# Patient Record
Sex: MALE | Race: WHITE | NOT HISPANIC OR LATINO | Employment: OTHER | URBAN - METROPOLITAN AREA
[De-identification: names, ages, dates, MRNs, and addresses within clinical notes are randomized per-mention and may not be internally consistent; named-entity substitution may affect disease eponyms.]

---

## 2017-11-29 RX ORDER — NAPROXEN 500 MG/1
500 TABLET ORAL
COMMUNITY
Start: 2014-03-11

## 2017-11-29 RX ORDER — LISINOPRIL 10 MG/1
10 TABLET ORAL
COMMUNITY
Start: 2016-06-14

## 2017-11-29 RX ORDER — IBUPROFEN 800 MG/1
800 TABLET, FILM COATED ORAL
COMMUNITY
Start: 2016-06-14 | End: 2020-09-20

## 2017-11-29 RX ORDER — NITROGLYCERIN 0.4 MG/1
0.4 TABLET SUBLINGUAL
COMMUNITY
Start: 2016-06-14

## 2017-11-29 RX ORDER — ATORVASTATIN CALCIUM 40 MG/1
40 TABLET, FILM COATED ORAL
COMMUNITY
Start: 2016-06-14

## 2017-11-29 RX ORDER — ASPIRIN 81 MG/1
81 TABLET ORAL
COMMUNITY
Start: 2012-07-17

## 2017-12-01 ENCOUNTER — ANESTHESIA EVENT (OUTPATIENT)
Dept: GASTROENTEROLOGY | Facility: CLINIC | Age: 67
End: 2017-12-01
Payer: MEDICARE

## 2017-12-01 NOTE — ANESTHESIA PREPROCEDURE EVALUATION
Anesthesia Evaluation     .             ROS/MED HX    ENT/Pulmonary:       Neurologic: Comment: [place] Palsy      Cardiovascular:     (+) Dyslipidemia, hypertension-Peripheral Vascular Disease-CAD, --. : . . . :. .       METS/Exercise Tolerance:  >4 METS   Hematologic:  - neg hematologic  ROS       Musculoskeletal:  - neg musculoskeletal ROS       GI/Hepatic:  - neg GI/hepatic ROS       Renal/Genitourinary:  - ROS Renal section negative       Endo:     (+) Obesity, .      Psychiatric:         Infectious Disease:  - neg infectious disease ROS       Malignancy:      - no malignancy   Other:    - neg other ROS                 Physical Exam  Normal systems: cardiovascular, pulmonary and dental    Airway   Mallampati: II  TM distance: >3 FB  Neck ROM: full    Dental     Cardiovascular       Pulmonary                     Anesthesia Plan      History & Physical Review  History and physical reviewed and following examination; no interval change.    ASA Status:  3 .    NPO Status:  > 8 hours    Plan for General and MAC with Propofol and Intravenous induction. Reason for MAC:  Deep or markedly invasive procedure (G8)  PONV prophylaxis:  Ondansetron (or other 5HT-3) and Dexamethasone or Solumedrol       Postoperative Care  Postoperative pain management:  IV analgesics and Oral pain medications.      Consents  Anesthetic plan, risks, benefits and alternatives discussed with:  Patient..                          .

## 2017-12-04 ENCOUNTER — ANESTHESIA (OUTPATIENT)
Dept: GASTROENTEROLOGY | Facility: CLINIC | Age: 67
End: 2017-12-04
Payer: MEDICARE

## 2017-12-04 ENCOUNTER — HOSPITAL ENCOUNTER (OUTPATIENT)
Facility: CLINIC | Age: 67
Discharge: HOME | End: 2017-12-04
Attending: SURGERY | Admitting: SURGERY
Payer: MEDICARE

## 2017-12-04 ENCOUNTER — SURGERY (OUTPATIENT)
Age: 67
End: 2017-12-04

## 2017-12-04 VITALS
OXYGEN SATURATION: 97 % | WEIGHT: 285 LBS | SYSTOLIC BLOOD PRESSURE: 106 MMHG | HEART RATE: 64 BPM | HEIGHT: 68 IN | DIASTOLIC BLOOD PRESSURE: 66 MMHG | RESPIRATION RATE: 16 BRPM | TEMPERATURE: 96.8 F | BODY MASS INDEX: 43.19 KG/M2

## 2017-12-04 LAB — COLONOSCOPY: NORMAL

## 2017-12-04 PROCEDURE — 25000128 H RX IP 250 OP 636: Performed by: NURSE ANESTHETIST, CERTIFIED REGISTERED

## 2017-12-04 PROCEDURE — 25000125 ZZHC RX 250: Performed by: NURSE ANESTHETIST, CERTIFIED REGISTERED

## 2017-12-04 PROCEDURE — 25000128 H RX IP 250 OP 636: Performed by: SURGERY

## 2017-12-04 PROCEDURE — G0121 COLON CA SCRN NOT HI RSK IND: HCPCS | Performed by: SURGERY

## 2017-12-04 PROCEDURE — 37000008 ZZH ANESTHESIA TECHNICAL FEE, 1ST 30 MIN: Performed by: SURGERY

## 2017-12-04 PROCEDURE — 45378 DIAGNOSTIC COLONOSCOPY: CPT | Performed by: SURGERY

## 2017-12-04 PROCEDURE — 25000125 ZZHC RX 250: Performed by: SURGERY

## 2017-12-04 RX ORDER — IBUPROFEN 800 MG/1
TABLET, FILM COATED ORAL
COMMUNITY
Start: 2017-10-02 | End: 2020-09-20

## 2017-12-04 RX ORDER — NITROGLYCERIN 0.4 MG/1
0.4 TABLET SUBLINGUAL
COMMUNITY
Start: 2016-06-14

## 2017-12-04 RX ORDER — FLUCONAZOLE 200 MG/1
200 TABLET ORAL
COMMUNITY
Start: 2017-08-28 | End: 2018-02-06

## 2017-12-04 RX ORDER — ATORVASTATIN CALCIUM 40 MG/1
40 TABLET, FILM COATED ORAL
COMMUNITY
Start: 2017-09-26

## 2017-12-04 RX ORDER — PROPOFOL 10 MG/ML
INJECTION, EMULSION INTRAVENOUS CONTINUOUS PRN
Status: DISCONTINUED | OUTPATIENT
Start: 2017-12-04 | End: 2017-12-04

## 2017-12-04 RX ORDER — SODIUM CHLORIDE, SODIUM LACTATE, POTASSIUM CHLORIDE, CALCIUM CHLORIDE 600; 310; 30; 20 MG/100ML; MG/100ML; MG/100ML; MG/100ML
INJECTION, SOLUTION INTRAVENOUS CONTINUOUS
Status: DISCONTINUED | OUTPATIENT
Start: 2017-12-04 | End: 2017-12-04 | Stop reason: HOSPADM

## 2017-12-04 RX ORDER — KETOCONAZOLE 20 MG/G
CREAM TOPICAL
COMMUNITY
Start: 2017-08-28

## 2017-12-04 RX ORDER — LIDOCAINE 40 MG/G
CREAM TOPICAL
Status: DISCONTINUED | OUTPATIENT
Start: 2017-12-04 | End: 2017-12-04 | Stop reason: HOSPADM

## 2017-12-04 RX ORDER — LIDOCAINE HYDROCHLORIDE 10 MG/ML
INJECTION, SOLUTION INFILTRATION; PERINEURAL PRN
Status: DISCONTINUED | OUTPATIENT
Start: 2017-12-04 | End: 2017-12-04

## 2017-12-04 RX ORDER — LISINOPRIL 10 MG/1
10 TABLET ORAL
COMMUNITY
Start: 2017-10-17

## 2017-12-04 RX ORDER — PROPOFOL 10 MG/ML
INJECTION, EMULSION INTRAVENOUS PRN
Status: DISCONTINUED | OUTPATIENT
Start: 2017-12-04 | End: 2017-12-04

## 2017-12-04 RX ORDER — ONDANSETRON 2 MG/ML
4 INJECTION INTRAMUSCULAR; INTRAVENOUS
Status: DISCONTINUED | OUTPATIENT
Start: 2017-12-04 | End: 2017-12-04 | Stop reason: HOSPADM

## 2017-12-04 RX ADMIN — PROPOFOL 100 MG: 10 INJECTION, EMULSION INTRAVENOUS at 12:52

## 2017-12-04 RX ADMIN — SODIUM CHLORIDE, POTASSIUM CHLORIDE, SODIUM LACTATE AND CALCIUM CHLORIDE: 600; 310; 30; 20 INJECTION, SOLUTION INTRAVENOUS at 12:07

## 2017-12-04 RX ADMIN — PROPOFOL 200 MCG/KG/MIN: 10 INJECTION, EMULSION INTRAVENOUS at 12:56

## 2017-12-04 RX ADMIN — LIDOCAINE HYDROCHLORIDE 50 MG: 10 INJECTION, SOLUTION INFILTRATION; PERINEURAL at 12:52

## 2017-12-04 RX ADMIN — LIDOCAINE HYDROCHLORIDE 1 ML: 10 INJECTION, SOLUTION EPIDURAL; INFILTRATION; INTRACAUDAL; PERINEURAL at 12:07

## 2017-12-04 NOTE — H&P
"[facility]  GI Pre-Procedure History & Physical      Name: Vinnie Leavitt MRN#: [id number]   Age: 67 year old YOB: 1950     Date of Procedure: 12/4/2017    Primary care provider: Hoang Goodman      Reason for Procedure:   Screening (V76.51), Blood in stool (569.3)    Problem List:   See a copy of the patient s current problem list from [facility].  I have reviewed this document and have no additions or corrections.    Current Outpatient Medications:      No current outpatient prescriptions on file.        Allergies:      Allergies   Allergen Reactions     Penicillins Swelling and Hives     Throat       Tramadol Hives and Rash        History:   See a copy of the patient s current past history from [facility].  I have reviewed this document and have no additions or corrections.    Physical Exam:   Vital Signs:  /80  Pulse 64  Temp 98.5  F (36.9  C) (Oral)  Resp 18  Ht 1.727 m (5' 8\")  Wt 129.3 kg (285 lb)  SpO2 96%  BMI 43.33 kg/m2  Airway assessment:  Patient is able to open mouth wide  Patient is able to stick out tongue    ASA:  2  Mallampati Score: 2     Lungs:  No increased work of breathing, good air exchange, clear to auscultation bilaterally, no crackles or wheezing.   Cardiovascular:  Normal- regular rate and rhythm, normal S1 - S2, no S3 or S4, and no murmur noted.  Gastrointestinal:  Abdomen soft, non-tender.  BS normal. No masses, organomegaly.        Assessment:   Appropriately NPO.  No significant changes since H&P.    Plan:   Moderate (conscious) sedation     General and specific risks of the procedure of the procedure including pain, bleeding, and perforation were discussed. Possibility of missing lesions discussed. Prep and recovery were discussed. He asked appropriate questions and provided consent.      Patient's active problems diagnostically and therapeutically optimized for the planned procedure. Risks, benefits, alternatives to sedation and blood explained and " consent obtained.  Risks, benefits, alternatives to procedure explained and consent obtained.    I have reviewed the history and physical, lab finding(s), diagnostic data, medications, and the plan for sedation.  I have determined this patient to be an appropriate candidate for the planned sedation/procedure and have reassessed the patient immediately prior to sedation/procedure.    Amadou Diallo MD

## 2017-12-04 NOTE — ANESTHESIA CARE TRANSFER NOTE
Patient: Vinnie Leavitt    Procedure(s):  Colonoscopy - Wound Class: II-Clean Contaminated    Diagnosis: screening  Diagnosis Additional Information: No value filed.    Anesthesia Type:   No value filed.     Note:  Airway :Nasal Cannula  Patient transferred to:Phase II  Handoff Report: Identifed the Patient, Identified the Reponsible Provider, Reviewed the pertinent medical history, Discussed the surgical course, Reviewed Intra-OP anesthesia mangement and issues during anesthesia, Set expectations for post-procedure period and Allowed opportunity for questions and acknowledgement of understanding      Vitals: (Last set prior to Anesthesia Care Transfer)    CRNA VITALS  12/4/2017 1242 - 12/4/2017 1312      12/4/2017             Pulse: 61    SpO2: 97 %                Electronically Signed By: NATHANIEL Villafana CRNA  December 4, 2017  1:12 PM

## 2017-12-04 NOTE — OR NURSING
in to see pt and wife. Vss. Iv dc will eat and drink. Instructions given and no further questions.

## 2017-12-04 NOTE — ANESTHESIA POSTPROCEDURE EVALUATION
Patient: Vinnie Leavitt    Procedure(s):  Colonoscopy - Wound Class: II-Clean Contaminated    Diagnosis:screening  Diagnosis Additional Information: No value filed.    Anesthesia Type:  No value filed.    Note:  Anesthesia Post Evaluation    Patient location during evaluation: Bedside  Patient participation: Able to fully participate in evaluation  Level of consciousness: sleepy but conscious  Pain management: adequate  Airway patency: patent  Cardiovascular status: stable  Respiratory status: nasal cannula  Hydration status: stable  PONV: none     Anesthetic complications: None          Last vitals:  Vitals:    12/04/17 1136   BP: 120/80   Pulse: 64   Resp: 18   Temp: 36.9  C (98.5  F)   SpO2: 96%         Electronically Signed By: NATHANIEL Villafana CRNA  December 4, 2017  1:12 PM

## 2018-02-10 ENCOUNTER — HOSPITAL ENCOUNTER (EMERGENCY)
Facility: CLINIC | Age: 68
Discharge: HOME | End: 2018-02-10
Attending: NURSE PRACTITIONER | Admitting: NURSE PRACTITIONER
Payer: MEDICARE

## 2018-02-10 ENCOUNTER — APPOINTMENT (OUTPATIENT)
Dept: GENERAL RADIOLOGY | Facility: CLINIC | Age: 68
End: 2018-02-10
Attending: NURSE PRACTITIONER
Payer: MEDICARE

## 2018-02-10 VITALS
TEMPERATURE: 98.3 F | OXYGEN SATURATION: 98 % | RESPIRATION RATE: 16 BRPM | DIASTOLIC BLOOD PRESSURE: 93 MMHG | HEIGHT: 68 IN | SYSTOLIC BLOOD PRESSURE: 164 MMHG

## 2018-02-10 DIAGNOSIS — J11.1 INFLUENZA-LIKE ILLNESS: ICD-10-CM

## 2018-02-10 PROCEDURE — 99213 OFFICE O/P EST LOW 20 MIN: CPT | Performed by: NURSE PRACTITIONER

## 2018-02-10 PROCEDURE — 71046 X-RAY EXAM CHEST 2 VIEWS: CPT

## 2018-02-10 PROCEDURE — G0463 HOSPITAL OUTPT CLINIC VISIT: HCPCS | Mod: 25

## 2018-02-10 NOTE — ED AVS SNAPSHOT
[facility] Emergency Department    [address] [address]    Phone:  [phone]    Fax:  [phone]                                       Vinnie Leavitt   MRN: [id number]    Department:  [facility] Emergency Department   Date of Visit:  2/10/2018           After Visit Summary Signature Page     I have received my discharge instructions, and my questions have been answered. I have discussed any challenges I see with this plan with the nurse or doctor.    ..........................................................................................................................................  Patient/Patient Representative Signature      ..........................................................................................................................................  Patient Representative Print Name and Relationship to Patient    ..................................................               ................................................  Date                                            Time    ..........................................................................................................................................  Reviewed by Signature/Title    ...................................................              ..............................................  Date                                                            Time

## 2018-02-10 NOTE — ED PROVIDER NOTES
"  History     Chief Complaint   Patient presents with     Flu Symptoms     started tuesday     HPI  Vinnie Leavitt is a 68 year old male who presents to urgent care for nasal congestion, cough, and body aches. Symptoms started 4 days ago. Accompanied by chills. Denies chest pain or shortness of breath. Denies nausea or vomiting. Tolerating fluids. No known ill exposures.  Former smoker.     Problem List:    There are no active problems to display for this patient.       Past Medical History:    History reviewed. No pertinent past medical history.    Past Surgical History:    Past Surgical History:   Procedure Laterality Date     COLONOSCOPY N/A 12/4/2017    Procedure: COLONOSCOPY;  Colonoscopy;  Surgeon: Amadou Diallo MD;  Location: [facility]       Family History:    No family history on file.    Social History:  Marital Status:   [2]  Social History   Substance Use Topics     Smoking status: Former Smoker     Smokeless tobacco: Never Used     Alcohol use Yes      Comment: Occasional        Medications:      ketoconazole (NIZORAL) 2 % cream   atorvastatin (LIPITOR) 40 MG tablet   ibuprofen (ADVIL/MOTRIN) 800 MG tablet   lisinopril (PRINIVIL/ZESTRIL) 10 MG tablet   nitroGLYcerin (NITROSTAT) 0.4 MG sublingual tablet   lisinopril (PRINIVIL/ZESTRIL) 10 MG tablet   aspirin EC 81 MG EC tablet   atorvastatin (LIPITOR) 40 MG tablet   ibuprofen (ADVIL/MOTRIN) 800 MG tablet   naproxen (NAPROSYN) 500 MG tablet   nitroGLYcerin (NITROSTAT) 0.4 MG sublingual tablet   FLUCONAZOLE PO         Review of Systems  As mentioned above in the history present illness. All other systems were reviewed and are negative.    Physical Exam   BP: (!) 164/93  Heart Rate: 77  Temp: 98.3  F (36.8  C)  Resp: 16  Height: 172.7 cm (5' 8\")  SpO2: 98 %      Physical Exam    GENERAL APPEARANCE: healthy, alert and no distress  EYES: EOMI,  PERRL, conjunctiva clear  HENT: ear canals and TM's normal.  Nose and mouth without ulcers, erythema or " lesions  NECK: supple, nontender, no lymphadenopathy  RESP: lungs clear to auscultation - no rales, rhonchi or wheezes  CV: regular rates and rhythm, normal S1 S2, no murmur noted    ED Course     ED Course     Procedures              Results for orders placed or performed during the hospital encounter of 02/10/18 (from the past 48 hour(s))   XR Chest 2 Views    Narrative    CHEST TWO VIEWS 2/10/2018 12:58 PM     HISTORY: Cough.    COMPARISON: None.     FINDINGS:  There are no acute infiltrates. The cardiac silhouette is  not enlarged. Pulmonary vasculature is unremarkable. Dense nodule at  the right apex, likely a benign granuloma.      Impression    IMPRESSION: No acute disease.    BISI VALENZUELA MD         Labs Ordered and Resulted from Time of ED Arrival Up to the Time of Departure from the ED - No data to display    Assessments & Plan (with Medical Decision Making)   -xray negative for evidence of pneumonia. Likely influenza-like illness. Patient is outside the window for treatment with tamiflu. symptomatic treatment recommended. Worrisome reasons to return discussed.       I have reviewed the nursing notes.    I have reviewed the findings, diagnosis, plan and need for follow up with the patient.      New Prescriptions    No medications on file       Final diagnoses:   Influenza-like illness       2/10/2018   [facility] EMERGENCY DEPARTMENT     Mena Donovan, NATHANIEL CNP  02/10/18 1931

## 2018-02-10 NOTE — ED AVS SNAPSHOT
[facility] Emergency Department    [address] [address]    Phone:  [phone]    Fax:  [phone]                                       Vinnie Leavitt   MRN: [id number]    Department:  [facility] Emergency Department   Date of Visit:  2/10/2018           Patient Information     Date Of Birth          1950        Your diagnoses for this visit were:     Influenza-like illness        You were seen by Mena Donovan APRN CNP.      Follow-up Information     Follow up with Hoang Goodman MD.    Specialty:  Family Practice    Why:  As needed    Contact information:    [facility]  [address] [address]  [phone]          Discharge Instructions       Discharge Instructions  Influenza    You were diagnosed today with influenza or influenza like illness.  Influenza is a respiratory illness caused by influenza A or B viruses.  Influenza causes fever, headache, muscle aches, and fatigue.  These symptoms start one to four days after you have been around a person with this illness.  People with influenza commonly have a dry cough, sore throat, and a runny nose.   Influenza is spread through sneezing and coughing and can live on surfaces for several days.  It is usually contagious for 5 days but in some cases up to 10 days and often affects several family members. If you have a family member who is less than 2 years old, older than 65 years old, pregnant or has a serious medical condition, they should be seen right away by a physician to decide if they should take preventative medications.      Return to the Emergency Department if:    You have trouble breathing.    You develop pain in your chest.    You have signs of being dehydrated, such as dizziness or unable to urinate at least three times daily.    You feel confused.    You cannot stop vomiting or you cannot drink enough fluids.    In children, you should seek help if the child has any of  the above or if child:    Has blue or purplish skin color.    Is so irritable that he or she does not want to be held.    Does not have tears when crying (in infants) or does not urinate at least three times daily.    Does not wake up easily.    Follow-up with your doctor if you are not improving after 5 days.    What can I do to help myself?    Rest.    Fluids -- Drink hydrating solutions such as Gatorade  or Pedialyte  as often as you can. If you are drinking enough, you should pass urine at least every eight hours.    Tylenol  (acetaminophen) and Advil  (ibuprofen) can relieve fever, headache, and muscle aches. Do not give aspirin to children under 18 years old.     Antiviral treatment -- Antiviral medicines do not make the flu symptoms go away immediately.  They have only been shown to make the symptoms go away 12 to 24 hours sooner than they would without treatment.       Antibiotics -- Antibiotics are NOT useful for treating viral illnesses such as influenza. Antibiotics should only be used if there is a bacterial complication of the flu such as bacterial pneumonia, ear infection, or sinusitis.    Because you were diagnosed with a flu like illness you are very contagious.  This means you cannot work, attend school or  for at least 24 hours or until you no longer have a fever.  If you were given a prescription for medicine here today, be sure to read all of the information (including the package insert) that comes with your prescription.  This will include important information about the medicine, its side effects, and any warnings that you need to know about.  The pharmacist who fills the prescription can provide more information and answer questions you may have about the medicine.  If you have questions or concerns that the pharmacist cannot address, please call or return to the Emergency Department.       Remember that you can always come back to the Emergency Department if you are not able to see  your regular doctor in the amount of time listed above, if you get any new symptoms, or if there is anything that worries you.        24 Hour Appointment Hotline       To make an appointment at any [facility] clinic, call [phone] ([phone]). If you don't have a family doctor or clinic, we will help you find one. [facility] clinics are conveniently located to serve the needs of you and your family.             Review of your medicines      Our records show that you are taking the medicines listed below. If these are incorrect, please call your family doctor or clinic.        Dose / Directions Last dose taken    aspirin EC 81 MG EC tablet   Dose:  81 mg        Take 81 mg by mouth   Refills:  0        * atorvastatin 40 MG tablet   Commonly known as:  LIPITOR   Dose:  40 mg        Take 40 mg by mouth   Refills:  0        * atorvastatin 40 MG tablet   Commonly known as:  LIPITOR   Dose:  40 mg        Take 40 mg by mouth   Refills:  0        FLUCONAZOLE PO   Dose:  50 mg        Take 50 mg by mouth   Refills:  0        * ibuprofen 800 MG tablet   Commonly known as:  ADVIL/MOTRIN   Dose:  800 mg        Take 800 mg by mouth   Refills:  0        * ibuprofen 800 MG tablet   Commonly known as:  ADVIL/MOTRIN        Refills:  0        ketoconazole 2 % cream   Commonly known as:  NIZORAL        Refills:  0        * lisinopril 10 MG tablet   Commonly known as:  PRINIVIL/ZESTRIL   Dose:  10 mg        Take 10 mg by mouth   Refills:  0        * lisinopril 10 MG tablet   Commonly known as:  PRINIVIL/ZESTRIL   Dose:  10 mg        Take 10 mg by mouth   Refills:  0        naproxen 500 MG tablet   Commonly known as:  NAPROSYN   Dose:  500 mg        Take 500 mg by mouth   Refills:  0        * NITROSTAT 0.4 MG sublingual tablet   Dose:  0.4 mg   Generic drug:  nitroGLYcerin        Place 0.4 mg under the tongue   Refills:  0        * nitroGLYcerin 0.4 MG sublingual tablet   Commonly known as:  NITROSTAT   Dose:  0.4 mg        Place  "0.4 mg under the tongue   Refills:  0        * Notice:  This list has 8 medication(s) that are the same as other medications prescribed for you. Read the directions carefully, and ask your doctor or other care provider to review them with you.            Procedures and tests performed during your visit     XR Chest 2 Views      Orders Needing Specimen Collection     None      Pending Results     No orders found from 2/8/2018 to 2/11/2018.            Pending Culture Results     No orders found from 2/8/2018 to 2/11/2018.            Pending Results Instructions     If you had any lab results that were not finalized at the time of your Discharge, you can call the ED Lab Result RN at [phone]. You will be contacted by this team for any positive Lab results or changes in treatment. The nurses are available 7 days a week from 10A to 6:30P.  You can leave a message 24 hours per day and they will return your call.        Test Results From Your Hospital Stay        2/10/2018  1:04 PM      Narrative     CHEST TWO VIEWS 2/10/2018 12:58 PM     HISTORY: Cough.    COMPARISON: None.     FINDINGS:  There are no acute infiltrates. The cardiac silhouette is  not enlarged. Pulmonary vasculature is unremarkable. Dense nodule at  the right apex, likely a benign granuloma.        Impression     IMPRESSION: No acute disease.    BISI VALENZUELA MD                Thank you for choosing [facility]       Thank you for choosing [facility] for your care. Our goal is always to provide you with excellent care. Hearing back from our patients is one way we can continue to improve our services. Please take a few minutes to complete the written survey that you may receive in the mail after you visit with us. Thank you!        [employer] Information     "[employer] lets you send messages to your doctor, view your test results, renew your prescriptions, schedule appointments and more. To sign up, go to [email or web address] . Click on \"Log in\" on the left " "side of the screen, which will take you to the Welcome page. Then click on \"Sign up Now\" on the right side of the page.     You will be asked to enter the access code listed below, as well as some personal information. Please follow the directions to create your username and password.     Your access code is: [email or web address]  Expires: 2018  1:26 PM     Your access code will  in 90 days. If you need help or a new code, please call your [facility] clinic or [phone].        Care EveryWhere ID     This is your Care EveryWhere ID. This could be used by other organizations to access your [facility] medical records  [id number]        Equal Access to Services     ESTELITA BADILLO : Jan Bhatt, david rodriguez, vikram mclaughlin, manish sauer. [facility] [phone].    ATENCIÓN: Si habla español, tiene a edwards disposición servicios gratuitos de asistencia lingüística. Llame al [phone].    We comply with applicable federal civil rights laws and [place] laws. We do not discriminate on the basis of race, color, national origin, age, disability, sex, sexual orientation, or gender identity.            After Visit Summary       This is your record. Keep this with you and show to your community pharmacist(s) and doctor(s) at your next visit.                  "

## 2018-02-10 NOTE — DISCHARGE INSTRUCTIONS
Discharge Instructions  Influenza    You were diagnosed today with influenza or influenza like illness.  Influenza is a respiratory illness caused by influenza A or B viruses.  Influenza causes fever, headache, muscle aches, and fatigue.  These symptoms start one to four days after you have been around a person with this illness.  People with influenza commonly have a dry cough, sore throat, and a runny nose.   Influenza is spread through sneezing and coughing and can live on surfaces for several days.  It is usually contagious for 5 days but in some cases up to 10 days and often affects several family members. If you have a family member who is less than 2 years old, older than 65 years old, pregnant or has a serious medical condition, they should be seen right away by a physician to decide if they should take preventative medications.      Return to the Emergency Department if:    You have trouble breathing.    You develop pain in your chest.    You have signs of being dehydrated, such as dizziness or unable to urinate at least three times daily.    You feel confused.    You cannot stop vomiting or you cannot drink enough fluids.    In children, you should seek help if the child has any of the above or if child:    Has blue or purplish skin color.    Is so irritable that he or she does not want to be held.    Does not have tears when crying (in infants) or does not urinate at least three times daily.    Does not wake up easily.    Follow-up with your doctor if you are not improving after 5 days.    What can I do to help myself?    Rest.    Fluids -- Drink hydrating solutions such as Gatorade  or Pedialyte  as often as you can. If you are drinking enough, you should pass urine at least every eight hours.    Tylenol  (acetaminophen) and Advil  (ibuprofen) can relieve fever, headache, and muscle aches. Do not give aspirin to children under 18 years old.     Antiviral treatment -- Antiviral medicines do not make the  flu symptoms go away immediately.  They have only been shown to make the symptoms go away 12 to 24 hours sooner than they would without treatment.       Antibiotics -- Antibiotics are NOT useful for treating viral illnesses such as influenza. Antibiotics should only be used if there is a bacterial complication of the flu such as bacterial pneumonia, ear infection, or sinusitis.    Because you were diagnosed with a flu like illness you are very contagious.  This means you cannot work, attend school or  for at least 24 hours or until you no longer have a fever.  If you were given a prescription for medicine here today, be sure to read all of the information (including the package insert) that comes with your prescription.  This will include important information about the medicine, its side effects, and any warnings that you need to know about.  The pharmacist who fills the prescription can provide more information and answer questions you may have about the medicine.  If you have questions or concerns that the pharmacist cannot address, please call or return to the Emergency Department.       Remember that you can always come back to the Emergency Department if you are not able to see your regular doctor in the amount of time listed above, if you get any new symptoms, or if there is anything that worries you.

## 2018-02-10 NOTE — ED NOTES
Patient here for SILVANA, symptoms started 4 days ago.  Patient presents ambualtory to the urgent care.

## 2020-01-02 ENCOUNTER — HOSPITAL ENCOUNTER (EMERGENCY)
Facility: CLINIC | Age: 70
Discharge: HOME | End: 2020-01-02
Attending: NURSE PRACTITIONER | Admitting: NURSE PRACTITIONER
Payer: MEDICARE

## 2020-01-02 VITALS
SYSTOLIC BLOOD PRESSURE: 160 MMHG | RESPIRATION RATE: 18 BRPM | HEIGHT: 69 IN | OXYGEN SATURATION: 96 % | DIASTOLIC BLOOD PRESSURE: 66 MMHG | BODY MASS INDEX: 40.29 KG/M2 | WEIGHT: 272 LBS | TEMPERATURE: 97.7 F

## 2020-01-02 DIAGNOSIS — M54.50 ACUTE RIGHT-SIDED LOW BACK PAIN WITHOUT SCIATICA: ICD-10-CM

## 2020-01-02 PROBLEM — R00.1 BRADYCARDIA: Status: ACTIVE | Noted: 2017-04-26

## 2020-01-02 LAB
ALBUMIN UR-MCNC: NEGATIVE MG/DL
APPEARANCE UR: CLEAR
BILIRUB UR QL STRIP: NEGATIVE
COLOR UR AUTO: YELLOW
GLUCOSE UR STRIP-MCNC: NEGATIVE MG/DL
HGB UR QL STRIP: NEGATIVE
KETONES UR STRIP-MCNC: NEGATIVE MG/DL
LEUKOCYTE ESTERASE UR QL STRIP: NEGATIVE
MUCOUS THREADS #/AREA URNS LPF: PRESENT /LPF
NITRATE UR QL: NEGATIVE
PH UR STRIP: 6 PH (ref 5–7)
RBC #/AREA URNS AUTO: 1 /HPF (ref 0–2)
SOURCE: ABNORMAL
SP GR UR STRIP: 1.02 (ref 1–1.03)
UROBILINOGEN UR STRIP-MCNC: 0 MG/DL (ref 0–2)
WBC #/AREA URNS AUTO: <1 /HPF (ref 0–5)

## 2020-01-02 PROCEDURE — 81001 URINALYSIS AUTO W/SCOPE: CPT | Performed by: NURSE PRACTITIONER

## 2020-01-02 PROCEDURE — G0463 HOSPITAL OUTPT CLINIC VISIT: HCPCS | Performed by: NURSE PRACTITIONER

## 2020-01-02 PROCEDURE — 87086 URINE CULTURE/COLONY COUNT: CPT | Performed by: NURSE PRACTITIONER

## 2020-01-02 PROCEDURE — 99214 OFFICE O/P EST MOD 30 MIN: CPT | Mod: Z6 | Performed by: NURSE PRACTITIONER

## 2020-01-02 RX ORDER — CYCLOBENZAPRINE HCL 10 MG
10 TABLET ORAL 3 TIMES DAILY PRN
Qty: 20 TABLET | Refills: 0 | Status: SHIPPED | OUTPATIENT
Start: 2020-01-02 | End: 2020-01-08

## 2020-01-02 ASSESSMENT — ENCOUNTER SYMPTOMS
ABDOMINAL PAIN: 0
VOMITING: 0
DIARRHEA: 0
COUGH: 0
DYSURIA: 0
FEVER: 1
FREQUENCY: 0
CHILLS: 1
FLANK PAIN: 1
NEUROLOGICAL NEGATIVE: 1
CONSTIPATION: 0
BLOOD IN STOOL: 0
NAUSEA: 0
HEMATURIA: 0

## 2020-01-02 ASSESSMENT — MIFFLIN-ST. JEOR: SCORE: 1984.16

## 2020-01-02 NOTE — ED AVS SNAPSHOT
[facility]  [address] [address]  Phone:  [phone]  Fax:  [phone]                                    Vinnie Leavitt   MRN: [id number]    Department:  [facility]   Date of Visit:  1/2/2020           After Visit Summary Signature Page    I have received my discharge instructions, and my questions have been answered. I have discussed any challenges I see with this plan with the nurse or doctor.    ..........................................................................................................................................  Patient/Patient Representative Signature      ..........................................................................................................................................  Patient Representative Print Name and Relationship to Patient    ..................................................               ................................................  Date                                   Time    ..........................................................................................................................................  Reviewed by Signature/Title    ...................................................              ..............................................  Date                                               Time          22EPIC Rev 08/18

## 2020-01-03 LAB
BACTERIA SPEC CULT: NO GROWTH
Lab: NORMAL
SPECIMEN SOURCE: NORMAL

## 2020-08-11 ENCOUNTER — HOSPITAL ENCOUNTER (OUTPATIENT)
Dept: CARDIAC REHAB | Facility: CLINIC | Age: 70
End: 2020-08-11
Attending: INTERNAL MEDICINE
Payer: MEDICARE

## 2020-08-11 PROCEDURE — 93798 PHYS/QHP OP CAR RHAB W/ECG: CPT

## 2020-08-11 PROCEDURE — 40000116 ZZH STATISTIC OP CR VISIT

## 2020-08-14 ENCOUNTER — HOSPITAL ENCOUNTER (OUTPATIENT)
Dept: CARDIAC REHAB | Facility: CLINIC | Age: 70
End: 2020-08-14
Attending: INTERNAL MEDICINE
Payer: MEDICARE

## 2020-08-14 PROCEDURE — 40000116 ZZH STATISTIC OP CR VISIT

## 2020-08-14 PROCEDURE — 93798 PHYS/QHP OP CAR RHAB W/ECG: CPT

## 2020-08-17 ENCOUNTER — HOSPITAL ENCOUNTER (OUTPATIENT)
Dept: CARDIAC REHAB | Facility: CLINIC | Age: 70
End: 2020-08-17
Attending: INTERNAL MEDICINE
Payer: MEDICARE

## 2020-08-17 PROCEDURE — 93797 PHYS/QHP OP CAR RHAB WO ECG: CPT

## 2020-08-17 PROCEDURE — 40000575 ZZH STATISTIC OP CARDIAC VISIT #2

## 2020-08-17 PROCEDURE — 40000116 ZZH STATISTIC OP CR VISIT

## 2020-08-17 PROCEDURE — 93798 PHYS/QHP OP CAR RHAB W/ECG: CPT

## 2020-08-21 ENCOUNTER — HOSPITAL ENCOUNTER (OUTPATIENT)
Dept: CARDIAC REHAB | Facility: CLINIC | Age: 70
End: 2020-08-21
Attending: INTERNAL MEDICINE
Payer: MEDICARE

## 2020-08-21 PROCEDURE — 93798 PHYS/QHP OP CAR RHAB W/ECG: CPT

## 2020-08-21 PROCEDURE — 40000116 ZZH STATISTIC OP CR VISIT

## 2020-08-24 ENCOUNTER — HOSPITAL ENCOUNTER (OUTPATIENT)
Dept: CARDIAC REHAB | Facility: CLINIC | Age: 70
End: 2020-08-24
Attending: INTERNAL MEDICINE
Payer: MEDICARE

## 2020-08-24 PROCEDURE — 40000116 ZZH STATISTIC OP CR VISIT

## 2020-08-24 PROCEDURE — 93798 PHYS/QHP OP CAR RHAB W/ECG: CPT

## 2020-08-26 ENCOUNTER — HOSPITAL ENCOUNTER (OUTPATIENT)
Dept: CARDIAC REHAB | Facility: CLINIC | Age: 70
End: 2020-08-26
Attending: INTERNAL MEDICINE
Payer: MEDICARE

## 2020-08-26 PROCEDURE — 40000116 ZZH STATISTIC OP CR VISIT

## 2020-08-26 PROCEDURE — 93798 PHYS/QHP OP CAR RHAB W/ECG: CPT

## 2020-08-31 ENCOUNTER — HOSPITAL ENCOUNTER (OUTPATIENT)
Dept: CARDIAC REHAB | Facility: CLINIC | Age: 70
End: 2020-08-31
Attending: INTERNAL MEDICINE
Payer: MEDICARE

## 2020-08-31 PROCEDURE — 93798 PHYS/QHP OP CAR RHAB W/ECG: CPT

## 2020-08-31 PROCEDURE — 40000116 ZZH STATISTIC OP CR VISIT

## 2020-09-02 ENCOUNTER — HOSPITAL ENCOUNTER (OUTPATIENT)
Dept: CARDIAC REHAB | Facility: CLINIC | Age: 70
End: 2020-09-02
Attending: INTERNAL MEDICINE
Payer: MEDICARE

## 2020-09-02 PROCEDURE — 40000116 ZZH STATISTIC OP CR VISIT

## 2020-09-02 PROCEDURE — 93798 PHYS/QHP OP CAR RHAB W/ECG: CPT

## 2020-09-04 ENCOUNTER — HOSPITAL ENCOUNTER (OUTPATIENT)
Dept: CARDIAC REHAB | Facility: CLINIC | Age: 70
End: 2020-09-04
Attending: INTERNAL MEDICINE
Payer: MEDICARE

## 2020-09-04 PROCEDURE — 93798 PHYS/QHP OP CAR RHAB W/ECG: CPT

## 2020-09-04 PROCEDURE — 40000116 ZZH STATISTIC OP CR VISIT

## 2020-09-09 ENCOUNTER — HOSPITAL ENCOUNTER (OUTPATIENT)
Dept: CARDIAC REHAB | Facility: CLINIC | Age: 70
End: 2020-09-09
Attending: INTERNAL MEDICINE
Payer: MEDICARE

## 2020-09-09 PROCEDURE — 93798 PHYS/QHP OP CAR RHAB W/ECG: CPT

## 2020-09-09 PROCEDURE — 40000116 ZZH STATISTIC OP CR VISIT

## 2020-09-14 ENCOUNTER — HOSPITAL ENCOUNTER (OUTPATIENT)
Dept: CARDIAC REHAB | Facility: CLINIC | Age: 70
End: 2020-09-14
Attending: INTERNAL MEDICINE
Payer: MEDICARE

## 2020-09-14 PROCEDURE — 40000116 ZZH STATISTIC OP CR VISIT

## 2020-09-14 PROCEDURE — 93798 PHYS/QHP OP CAR RHAB W/ECG: CPT

## 2020-09-16 ENCOUNTER — HOSPITAL ENCOUNTER (OUTPATIENT)
Dept: CARDIAC REHAB | Facility: CLINIC | Age: 70
End: 2020-09-16
Attending: INTERNAL MEDICINE
Payer: MEDICARE

## 2020-09-16 PROCEDURE — 93798 PHYS/QHP OP CAR RHAB W/ECG: CPT

## 2020-09-16 PROCEDURE — 40000116 ZZH STATISTIC OP CR VISIT

## 2020-09-18 ENCOUNTER — HOSPITAL ENCOUNTER (OUTPATIENT)
Dept: CARDIAC REHAB | Facility: CLINIC | Age: 70
End: 2020-09-18
Attending: INTERNAL MEDICINE
Payer: MEDICARE

## 2020-09-18 PROCEDURE — 40000116 ZZH STATISTIC OP CR VISIT

## 2020-09-18 PROCEDURE — 93798 PHYS/QHP OP CAR RHAB W/ECG: CPT

## 2020-09-20 ENCOUNTER — HOSPITAL ENCOUNTER (EMERGENCY)
Facility: CLINIC | Age: 70
Discharge: HOME | End: 2020-09-20
Attending: EMERGENCY MEDICINE | Admitting: EMERGENCY MEDICINE
Payer: MEDICARE

## 2020-09-20 VITALS
DIASTOLIC BLOOD PRESSURE: 103 MMHG | HEART RATE: 66 BPM | SYSTOLIC BLOOD PRESSURE: 167 MMHG | HEIGHT: 68 IN | WEIGHT: 274 LBS | RESPIRATION RATE: 18 BRPM | OXYGEN SATURATION: 98 % | TEMPERATURE: 98.5 F | BODY MASS INDEX: 41.52 KG/M2

## 2020-09-20 DIAGNOSIS — H18.892 CORNEAL RUST RING OF LEFT EYE: ICD-10-CM

## 2020-09-20 DIAGNOSIS — T15.02XA CORNEAL FOREIGN BODY, LEFT, INITIAL ENCOUNTER: ICD-10-CM

## 2020-09-20 PROCEDURE — 99283 EMERGENCY DEPT VISIT LOW MDM: CPT | Mod: 25 | Performed by: EMERGENCY MEDICINE

## 2020-09-20 PROCEDURE — 25000125 ZZHC RX 250: Performed by: EMERGENCY MEDICINE

## 2020-09-20 PROCEDURE — 65222 REMOVE FOREIGN BODY FROM EYE: CPT | Mod: LT | Performed by: EMERGENCY MEDICINE

## 2020-09-20 PROCEDURE — 99284 EMERGENCY DEPT VISIT MOD MDM: CPT | Mod: 25 | Performed by: EMERGENCY MEDICINE

## 2020-09-20 RX ORDER — TETRACAINE HYDROCHLORIDE 5 MG/ML
1-2 SOLUTION OPHTHALMIC ONCE
Status: COMPLETED | OUTPATIENT
Start: 2020-09-20 | End: 2020-09-20

## 2020-09-20 RX ORDER — OFLOXACIN 3 MG/ML
1-2 SOLUTION/ DROPS OPHTHALMIC 4 TIMES DAILY
Refills: 0
Start: 2020-09-20 | End: 2020-09-25

## 2020-09-20 RX ORDER — ACETAMINOPHEN 500 MG
1000 TABLET ORAL EVERY 8 HOURS PRN
Qty: 30 TABLET | Refills: 0 | COMMUNITY
Start: 2020-09-20 | End: 2020-09-25

## 2020-09-20 RX ORDER — OFLOXACIN 3 MG/ML
1-2 SOLUTION/ DROPS OPHTHALMIC 4 TIMES DAILY
Status: DISCONTINUED | OUTPATIENT
Start: 2020-09-20 | End: 2020-09-21 | Stop reason: HOSPADM

## 2020-09-20 RX ORDER — IBUPROFEN 200 MG
800 TABLET ORAL EVERY 8 HOURS PRN
Qty: 30 TABLET | Refills: 0 | COMMUNITY
Start: 2020-09-20 | End: 2020-09-25

## 2020-09-20 RX ADMIN — TETRACAINE HYDROCHLORIDE 1 DROP: 5 SOLUTION OPHTHALMIC at 23:00

## 2020-09-20 RX ADMIN — OFLOXACIN 2 DROP: 3 SOLUTION/ DROPS OPHTHALMIC at 23:00

## 2020-09-20 ASSESSMENT — ENCOUNTER SYMPTOMS
EYE PAIN: 1
CHEST TIGHTNESS: 0
LIGHT-HEADEDNESS: 0
COUGH: 0
HEADACHES: 0
EYE REDNESS: 1
SHORTNESS OF BREATH: 0
FEVER: 0

## 2020-09-20 ASSESSMENT — VISUAL ACUITY: OU: 1

## 2020-09-20 ASSESSMENT — MIFFLIN-ST. JEOR: SCORE: 1972.36

## 2020-09-20 NOTE — ED AVS SNAPSHOT
[facility]  [address] [facility] [address]  Phone:  [phone]  Fax:  [phone]                                    Vinnie Leavitt   MRN: [id number]    Department:  [facility] Emergency Department   Date of Visit:  9/20/2020           After Visit Summary Signature Page    I have received my discharge instructions, and my questions have been answered. I have discussed any challenges I see with this plan with the nurse or doctor.    ..........................................................................................................................................  Patient/Patient Representative Signature      ..........................................................................................................................................  Patient Representative Print Name and Relationship to Patient    ..................................................               ................................................  Date                                   Time    ..........................................................................................................................................  Reviewed by Signature/Title    ...................................................              ..............................................  Date                                               Time          22EPIC Rev 08/18

## 2020-09-21 NOTE — ED PROVIDER NOTES
History     Chief Complaint   Patient presents with     Foreign Body in Eye     HPI  Vinnie Leavitt is a 70 year old male with no significant contributing past medical history presenting for evaluation of left eye pain and a foreign body sensation in the eye.  Patient reports he was working on a vehicle and grinding yesterday.  Was wearing safety glasses and does not recall getting anything into his eye.  Today around 4 PM started to get irritation with foreign body sensation.  He reports he looked in his eye and thought he saw something embedded in the eye.  He tried to flush it with saline but was unable to resolve the sensation and still saw an object he thought was embedded in the eyes so came in for evaluation.  Reports gritty foreign body sensation in the eye but no change in vision.  Otherwise feeling well.    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     Priority: Medium     Degenerative joint disease of knee, left 09/18/2013     Priority: Medium     HTN (hypertension) 09/18/2013     Priority: Medium     Bell's palsy 04/05/2010     Priority: Medium     Overview:   And vestibulitis either post viral or from h1n1 shot       ACS (acute coronary syndrome) (H) 06/08/2009     Priority: Medium     CAD (coronary atherosclerotic disease) 06/08/2009     Priority: Medium     Overview:   Lad stent, low level pos troponin  plavix til 2011,        Hyperlipidemia 04/18/2007     Priority: Medium     Obesity, unspecified 04/18/2007     Priority: Medium     Tuberculosis of peripheral lymph nodes, 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    Past Surgical History:    Past Surgical History:   Procedure Laterality Date     COLONOSCOPY N/A 12/4/2017    Procedure: COLONOSCOPY;  Colonoscopy;  Surgeon: Amadou Diallo MD;  Location: [facility]       Family History:    No family history on file.    Social History:  Marital Status:   [2]  Social History     Tobacco Use     Smoking status: Former Smoker     Smokeless tobacco: Never Used   Substance Use Topics     Alcohol use: Yes     Comment: Occasional     Drug use: No        Medications:    acetaminophen (TYLENOL) 500 MG tablet  ibuprofen (ADVIL/MOTRIN) 200 MG tablet  ofloxacin (OCUFLOX) 0.3 % ophthalmic solution  aspirin EC 81 MG EC tablet  atorvastatin (LIPITOR) 40 MG tablet  atorvastatin (LIPITOR) 40 MG tablet  FLUCONAZOLE PO  ketoconazole (NIZORAL) 2 % cream  lisinopril (PRINIVIL/ZESTRIL) 10 MG tablet  lisinopril (PRINIVIL/ZESTRIL) 10 MG tablet  naproxen (NAPROSYN) 500 MG tablet  nitroGLYcerin (NITROSTAT) 0.4 MG sublingual tablet  nitroGLYcerin (NITROSTAT) 0.4 MG sublingual tablet          Review of Systems   Constitutional: Negative for fever.   HENT: Negative for congestion.    Eyes: Positive for pain and redness. Negative for visual disturbance.        Left eye   Respiratory: Negative for cough, chest tightness and shortness of breath.    Neurological: Negative for light-headedness and headaches.   All other systems reviewed and are negative.      Physical Exam   BP: (!) 167/103  Pulse: 66  Temp: 98.5  F (36.9  C)  Resp: 18  Height: 172.7 cm (5' 8\")  Weight: 124.3 kg (274 lb)  SpO2: 98 %      Physical Exam  Vitals signs and nursing note reviewed.   Constitutional:       Appearance: Normal appearance. He is not ill-appearing or diaphoretic.   HENT:      Head: Atraumatic.      Nose: Nose normal.      Mouth/Throat:      Mouth: Mucous membranes are moist.   Eyes:      General: Lids are normal. Vision grossly intact. Gaze aligned appropriately.    "      Left eye: Foreign body (Metallic foreign body with residual rust ring at the margin at the 7 o'clock position) present.No discharge.      Extraocular Movements: Extraocular movements intact.      Conjunctiva/sclera:      Left eye: Left conjunctiva is injected.      Pupils: Pupils are equal, round, and reactive to light.      Left eye: No fluorescein uptake.     Neurological:      Mental Status: He is alert.         ED Course        [facility]    Foreign Body Removal - Ocular    Date/Time: 9/20/2020 10:57 PM  Performed by: Neno Thompson MD  Authorized by: Neno Thompson MD       LOCATION     Location:  L corneal    Depth:  Superficial    PRE PROCEDURE DETAILS:     Imaging:  None    Fluorescein exam: yes      Fluorescein uptake: no      ANESTHESIA (see MAR for exact dosages):     Local anesthetic:  Tetracaine drops    PROCEDURE DETAILS     Localization method:  Slit lamp    Removal mechanism:  Moist cotton swab    Foreign bodies recovered:  1    Description:  Howes metallic foreign body with irregular edges approximately 1 x 3 mm    Intact foreign body removal: yes      Residual rust ring observed: yes      Residual rust ring removed: Rust ring partially removed however located at the margin of the iris and sclera so patient had a small amount of bleeding from damage to a conjunctival vessel with use of the bur tool.      POST PROCEDURE DETAILS     Confirmation:  No additional foreign bodies on visualization and complete removal verified with slit lamp    Dressing:  Antibiotic drops    PROCEDURE   Patient Tolerance:  Patient tolerated the procedure well with no immediate complications                 No results found for this or any previous visit (from the past 24 hour(s)).    Medications   tetracaine (PONTOCAINE) 0.5 % ophthalmic solution 1-2 drop (has no administration in time range)   ofloxacin (OCUFLOX) 0.3 % ophthalmic solution 1-2 drop (has no  administration in time range)       Assessments & Plan (with Medical Decision Making)  70-year-old male presenting for evaluation of left eye foreign body after grinding on metal yesterday.  Initial exam identified a probable foreign body at the 7 o'clock position of the left eye.  On evaluation of the slit-lamp only the residual rust ring was identified in the cornea and the foreign body had dislodged and was visible along the lower lid margin and was removed successfully.  Attempted to remove the rust ring with a bur tool and it was partially removed however given the location at the margin of the sclera and iris, some conjunctival bleeding began so further burring was not done.  Recommended follow-up with ophthalmology in the next 2 to 3 days.  Started empirically on ofloxacin eyedrops.  Offered pain control with ibuprofen but patient declined.     I have reviewed the nursing notes.    I have reviewed the findings, diagnosis, plan and need for follow up with the patient.       New Prescriptions    ACETAMINOPHEN (TYLENOL) 500 MG TABLET    Take 2 tablets (1,000 mg) by mouth every 8 hours as needed for fever or pain    IBUPROFEN (ADVIL/MOTRIN) 200 MG TABLET    Take 4 tablets (800 mg) by mouth every 8 hours as needed for mild pain    OFLOXACIN (OCUFLOX) 0.3 % OPHTHALMIC SOLUTION    Place 1-2 drops Into the left eye 4 times daily for 5 days       Final diagnoses:   Corneal foreign body, left, initial encounter   Corneal rust ring of left eye       9/20/2020   [facility] EMERGENCY DEPARTMENT     Thompson, Neno Schmitt MD  09/20/20 7601

## 2020-09-23 ENCOUNTER — HOSPITAL ENCOUNTER (OUTPATIENT)
Dept: CARDIAC REHAB | Facility: CLINIC | Age: 70
End: 2020-09-23
Attending: INTERNAL MEDICINE
Payer: MEDICARE

## 2020-09-23 PROCEDURE — 40000116 ZZH STATISTIC OP CR VISIT

## 2020-09-23 PROCEDURE — 93798 PHYS/QHP OP CAR RHAB W/ECG: CPT

## 2020-09-25 ENCOUNTER — HOSPITAL ENCOUNTER (OUTPATIENT)
Dept: CARDIAC REHAB | Facility: CLINIC | Age: 70
End: 2020-09-25
Attending: INTERNAL MEDICINE
Payer: MEDICARE

## 2020-09-25 PROCEDURE — 93798 PHYS/QHP OP CAR RHAB W/ECG: CPT

## 2020-09-25 PROCEDURE — 40000116 ZZH STATISTIC OP CR VISIT

## 2020-09-28 ENCOUNTER — HOSPITAL ENCOUNTER (OUTPATIENT)
Dept: CARDIAC REHAB | Facility: CLINIC | Age: 70
End: 2020-09-28
Attending: INTERNAL MEDICINE
Payer: MEDICARE

## 2020-09-28 PROCEDURE — 93798 PHYS/QHP OP CAR RHAB W/ECG: CPT

## 2020-09-28 PROCEDURE — 40000116 ZZH STATISTIC OP CR VISIT

## 2020-09-30 ENCOUNTER — HOSPITAL ENCOUNTER (OUTPATIENT)
Dept: CARDIAC REHAB | Facility: CLINIC | Age: 70
End: 2020-09-30
Attending: INTERNAL MEDICINE
Payer: MEDICARE

## 2020-09-30 PROCEDURE — 40000116 ZZH STATISTIC OP CR VISIT

## 2020-09-30 PROCEDURE — 93798 PHYS/QHP OP CAR RHAB W/ECG: CPT

## 2020-10-02 ENCOUNTER — HOSPITAL ENCOUNTER (OUTPATIENT)
Dept: CARDIAC REHAB | Facility: CLINIC | Age: 70
End: 2020-10-02
Attending: INTERNAL MEDICINE
Payer: MEDICARE

## 2020-10-02 PROCEDURE — 93798 PHYS/QHP OP CAR RHAB W/ECG: CPT

## 2020-10-02 PROCEDURE — 999N000109 HC STATISTIC OP CR VISIT

## 2020-10-05 ENCOUNTER — HOSPITAL ENCOUNTER (OUTPATIENT)
Dept: CARDIAC REHAB | Facility: CLINIC | Age: 70
End: 2020-10-05
Attending: INTERNAL MEDICINE
Payer: MEDICARE

## 2020-10-05 PROCEDURE — 93798 PHYS/QHP OP CAR RHAB W/ECG: CPT

## 2020-10-05 PROCEDURE — 999N000109 HC STATISTIC OP CR VISIT

## 2020-10-07 ENCOUNTER — HOSPITAL ENCOUNTER (OUTPATIENT)
Dept: CARDIAC REHAB | Facility: CLINIC | Age: 70
End: 2020-10-07
Attending: INTERNAL MEDICINE
Payer: MEDICARE

## 2020-10-07 PROCEDURE — 93798 PHYS/QHP OP CAR RHAB W/ECG: CPT

## 2020-10-07 PROCEDURE — 999N000109 HC STATISTIC OP CR VISIT

## 2020-10-12 ENCOUNTER — HOSPITAL ENCOUNTER (OUTPATIENT)
Dept: CARDIAC REHAB | Facility: CLINIC | Age: 70
End: 2020-10-12
Attending: INTERNAL MEDICINE
Payer: MEDICARE

## 2020-10-12 PROCEDURE — 93798 PHYS/QHP OP CAR RHAB W/ECG: CPT

## 2020-10-12 PROCEDURE — 999N000109 HC STATISTIC OP CR VISIT

## 2020-10-14 ENCOUNTER — HOSPITAL ENCOUNTER (OUTPATIENT)
Dept: CARDIAC REHAB | Facility: CLINIC | Age: 70
End: 2020-10-14
Attending: INTERNAL MEDICINE
Payer: MEDICARE

## 2020-10-14 PROCEDURE — 93798 PHYS/QHP OP CAR RHAB W/ECG: CPT

## 2020-10-14 PROCEDURE — 999N000109 HC STATISTIC OP CR VISIT

## 2020-10-16 ENCOUNTER — HOSPITAL ENCOUNTER (OUTPATIENT)
Dept: CARDIAC REHAB | Facility: CLINIC | Age: 70
End: 2020-10-16
Attending: INTERNAL MEDICINE
Payer: MEDICARE

## 2020-10-16 PROCEDURE — 93798 PHYS/QHP OP CAR RHAB W/ECG: CPT

## 2020-10-16 PROCEDURE — 999N000109 HC STATISTIC OP CR VISIT

## 2020-10-19 ENCOUNTER — HOSPITAL ENCOUNTER (OUTPATIENT)
Dept: CARDIAC REHAB | Facility: CLINIC | Age: 70
End: 2020-10-19
Attending: INTERNAL MEDICINE
Payer: MEDICARE

## 2020-10-19 PROCEDURE — 93798 PHYS/QHP OP CAR RHAB W/ECG: CPT

## 2020-10-19 PROCEDURE — 999N000109 HC STATISTIC OP CR VISIT

## 2022-07-28 ENCOUNTER — HOSPITAL ENCOUNTER (EMERGENCY)
Facility: CLINIC | Age: 72
Discharge: HOME | End: 2022-07-28
Attending: EMERGENCY MEDICINE | Admitting: EMERGENCY MEDICINE
Payer: MEDICARE

## 2022-07-28 VITALS
DIASTOLIC BLOOD PRESSURE: 78 MMHG | SYSTOLIC BLOOD PRESSURE: 189 MMHG | TEMPERATURE: 97.4 F | RESPIRATION RATE: 16 BRPM | HEIGHT: 68 IN | OXYGEN SATURATION: 71 % | HEART RATE: 68 BPM | WEIGHT: 268 LBS | BODY MASS INDEX: 40.62 KG/M2

## 2022-07-28 DIAGNOSIS — S01.81XA LACERATION OF FOREHEAD, INITIAL ENCOUNTER: ICD-10-CM

## 2022-07-28 DIAGNOSIS — S09.90XA CLOSED HEAD INJURY, INITIAL ENCOUNTER: ICD-10-CM

## 2022-07-28 PROCEDURE — 12011 RPR F/E/E/N/L/M 2.5 CM/<: CPT | Performed by: EMERGENCY MEDICINE

## 2022-07-28 PROCEDURE — 99282 EMERGENCY DEPT VISIT SF MDM: CPT | Mod: 25 | Performed by: EMERGENCY MEDICINE

## 2022-07-28 PROCEDURE — 99283 EMERGENCY DEPT VISIT LOW MDM: CPT | Performed by: EMERGENCY MEDICINE

## 2022-07-28 ASSESSMENT — ENCOUNTER SYMPTOMS
RESPIRATORY NEGATIVE: 1
EYES NEGATIVE: 1
ENDOCRINE NEGATIVE: 1
PSYCHIATRIC NEGATIVE: 1
CARDIOVASCULAR NEGATIVE: 1
CONSTITUTIONAL NEGATIVE: 1
ALLERGIC/IMMUNOLOGIC NEGATIVE: 1
NEUROLOGICAL NEGATIVE: 1
MUSCULOSKELETAL NEGATIVE: 1
HEMATOLOGIC/LYMPHATIC NEGATIVE: 1
GASTROINTESTINAL NEGATIVE: 1
WOUND: 1

## 2022-07-29 NOTE — ED TRIAGE NOTES
Trip and fall hitting his head. Oozing blood. No LOC. No blood thinners   Triage Assessment     Row Name 07/28/22 3929       Triage Assessment (Adult)    Airway WDL WDL       Respiratory WDL    Respiratory WDL WDL       Cognitive/Neuro/Behavioral WDL    Cognitive/Neuro/Behavioral WDL WDL

## 2022-07-29 NOTE — ED PROVIDER NOTES
History     Chief Complaint   Patient presents with     Head Injury     Tripped over a gutter downspout and hit his head. No LOC. No blood thinners     HPI  Vinnie Leavitt is a 72 year old male who presents for evaluation after head trauma.  Patient on intake reported that he tripped and got her down spraining his head.  There was no loss of consciousness.     Medical records show history of hypertension, hyperlipidemia, degenerative disease of the knee and history of Bell's palsy and bradycardia.  Patient's prescribed medications were also reviewed with the patient.    On examination patient arrived by car from home reporting that he had tripped on the extension forgot at his home while cleaning windows at 8 PM striking his head on a piece of gravel gauze on the floor resulting in scalp wound and forehead laceration.  Patient reports no neck pain.  He reports he does not take any anticoagulation and is currently on atorvastatin and lisinopril.  His wife insisted he come in because the wound would not stop bleeding.    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     Priority: Medium     Degenerative joint disease of knee, left 09/18/2013     Priority: Medium     HTN (hypertension) 09/18/2013     Priority: Medium     Bell's palsy 04/05/2010     Priority: Medium     Overview:   And vestibulitis either post viral or from h1n1 shot       ACS (acute coronary syndrome) (H) 06/08/2009     Priority: Medium     CAD (coronary atherosclerotic disease) 06/08/2009     Priority: Medium     Overview:   Lad stent, low level pos troponin  plavix til 2011,        Hyperlipidemia 04/18/2007      "Priority: Medium     Obesity, unspecified 04/18/2007     Priority: Medium     Tuberculosis of peripheral lymph nodes, 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    Past Surgical History:    Past Surgical History:   Procedure Laterality Date     COLONOSCOPY N/A 12/4/2017    Procedure: COLONOSCOPY;  Colonoscopy;  Surgeon: Amadou Diallo MD;  Location: [facility] [facility]       Family History:    No family history on file.    Social History:  Marital Status:   [2]  Social History     Tobacco Use     Smoking status: Former Smoker     Smokeless tobacco: Never Used   Substance Use Topics     Alcohol use: Yes     Comment: Occasional     Drug use: No        Medications:    aspirin EC 81 MG EC tablet  atorvastatin (LIPITOR) 40 MG tablet  atorvastatin (LIPITOR) 40 MG tablet  FLUCONAZOLE PO  ketoconazole (NIZORAL) 2 % cream  lisinopril (PRINIVIL/ZESTRIL) 10 MG tablet  lisinopril (PRINIVIL/ZESTRIL) 10 MG tablet  naproxen (NAPROSYN) 500 MG tablet  nitroGLYcerin (NITROSTAT) 0.4 MG sublingual tablet  nitroGLYcerin (NITROSTAT) 0.4 MG sublingual tablet          Review of Systems   Constitutional: Negative.    HENT: Negative.    Eyes: Negative.    Respiratory: Negative.    Cardiovascular: Negative.    Gastrointestinal: Negative.    Endocrine: Negative.    Genitourinary: Negative.    Musculoskeletal: Negative.    Skin: Positive for wound (right forehead laceration and abrasion).   Allergic/Immunologic: Negative.    Neurological: Negative.    Hematological: Negative.    Psychiatric/Behavioral: Negative.    All other systems reviewed and are negative.      Physical Exam   BP: (!) 189/78  Pulse: 68  Temp: 97.4  F (36.3  C)  Resp: 16  Height: 172.7 cm (5' 8\")  Weight: 121.6 kg (268 lb)  SpO2: (!) 71 %      Physical Exam  Constitutional:       General: He is not in acute distress.     Appearance: Normal appearance. He is not ill-appearing, " "toxic-appearing or diaphoretic.   HENT:      Head: Normocephalic. Abrasion and laceration (1cm oblique laceration over the right forehead) present.        Nose: Nose normal.   Eyes:      Extraocular Movements: Extraocular movements intact.      Pupils: Pupils are equal, round, and reactive to light.   Cardiovascular:      Rate and Rhythm: Normal rate and regular rhythm.      Pulses: Normal pulses.      Heart sounds: Normal heart sounds.   Pulmonary:      Effort: Pulmonary effort is normal. No respiratory distress.      Breath sounds: Normal breath sounds. No stridor. No wheezing, rhonchi or rales.   Chest:      Chest wall: No tenderness.   Musculoskeletal:         General: No swelling, tenderness, deformity or signs of injury.      Cervical back: Normal range of motion and neck supple.      Right lower leg: No edema.      Left lower leg: No edema.   Skin:     Capillary Refill: Capillary refill takes less than 2 seconds.      Coloration: Skin is not jaundiced or pale.      Findings: No bruising, erythema, lesion or rash.   Neurological:      General: No focal deficit present.      Mental Status: He is alert and oriented to person, place, and time.      Cranial Nerves: No cranial nerve deficit.      Sensory: No sensory deficit.      Motor: No weakness.      Coordination: Coordination normal.      Gait: Gait normal.      Deep Tendon Reflexes: Reflexes normal.   Psychiatric:         Mood and Affect: Mood normal.         Behavior: Behavior normal.         Thought Content: Thought content normal.         Judgment: Judgment normal.         ED Course                 Procedures              Critical Care time:  none               ED medications: none      ED Vitals:  Vitals:    07/28/22 2158   BP: (!) 189/78   Pulse: 68   Resp: 16   Temp: 97.4  F (36.3  C)   TempSrc: Oral   SpO2: (!) 71%   Weight: 121.6 kg (268 lb)   Height: 1.727 m (5' 8\")     ED labs and imaging: none      Assessments & Plan (with Medical Decision Making) "   Assessment Summary and Clinical Impression: 72-year-old who presented for evaluation after closed head injury with trip and fall striking his head after falling face forward onto a piece of rock after trip on an extension for the gutter around his home while cleaning windows at 8 PM.  Patient reported that the bleeding would not stop and he presented for wound care and further evaluation.  No neck pain.  No other injuries.  Patient has an abrasion about his forehead and an oblique 1 cm laceration of the right forehead which was glued after reviewing options for wound closure.  We discussed imaging and patient declined imaging and I felt this was reasonable given GCS of 15 injury occurred over 3 hours patient is on anticoagulated and has no stigmata of further trauma.  He is reported no epistaxis and had no facial pain.  Reviewed worrisome symptoms including reasons to return for evaluation.  He expressed understanding and agreement with plan of care.    ED course and plan;  Reviewed the medical record.  We discussed wound care options including options for suture closure.  Patient was agreeable with skin glue closure with Dermabond given superficial wound and wound size and location.  Patient has an abrasion on the forehead but no step-offs.  The abrasion was covered topical antibiotic ointment.  Reviewed wound care at home and also reasons to return for evaluation.  We discussed neuroimaging given face planted and his age.  Patient is not anticoagulated.  He has no neurologic deficits.  He was comfortable going home without additional imaging.      Disclaimer: This note consists of symbols derived from keyboarding, dictation and/or voice recognition software. As a result, there may be errors in the script that have gone undetected. Please consider this when interpreting information found in this chart.  I have reviewed the nursing notes.    I have reviewed the findings, diagnosis, plan and need for follow up with  the patient.       Discharge Medication List as of 7/28/2022 11:43 PM          Final diagnoses:   Closed head injury, initial encounter - after trip on guttet extension and fall onto rock at home 8AM while cleaning windows   Laceration of forehead, initial encounter - 1cm laceration overlying right forehead       7/28/2022   [facility] EMERGENCY DEPT     Parker Dougherty MD  07/28/22 4484

## 2022-07-29 NOTE — DISCHARGE INSTRUCTIONS
1) Your trip and fall at home resulted in an abrasion about your forehead and a 1 cm oblique laceration that was glued after reviewing options for wound closure.  He reported your trip around 8 PM while cleaning windows and we reviewed the role of head imaging.  We have agreed to hold on head imaging at this time.    2) With application of skin glue we discussed worrisome symptoms including reasons to return for reevaluation.  We should apply topical antibiotic ointment at least twice a day over the area abrasion right forehead.    3) we have discussed that if you develop nausea and vomiting increasing headache or any new concerns you should return for evaluation where head imaging may need to be completed

## 2024-03-13 NOTE — ED PROVIDER NOTES
March 13, 2024      [facility]  [address]  [place] [address]  Phone: [phone]  Fax: [phone]       Patient: King Maura Reyes   YOB: 2015  Date of Visit: 03/13/2024    To Whom It May Concern:    Tavo Reyes  was at [facility] on 03/13/2024. The patient may return to work/school on 03/14/2024 with no restrictions. If you have any questions or concerns, or if I can be of further assistance, please do not hesitate to contact me.    Sincerely,    Myesha Raza MA        History     Chief Complaint   Patient presents with     Rule out Urinary Tract Infection     urinary frequency with back pain     HPI  Vinnie Leavitt is a 69 year old male who presents to urgent care for evaluation of right low back pain.  Symptoms started 3 to 4 days ago.  Denies fever or chills.  Denies any known injury.  Pain increases when he bends over towards his right.  Has noticed over the last 2 days some increased difficulty emptying his bladder.  Patient is concerned he has a urinary/kidney infection.  He also tells me he recently was put on meloxicam for osteoarthritis and thought maybe that medication was causing the back pain, so he stopped taking it and switched back to taking ibuprofen.    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     Priority: Medium     Degenerative joint disease of knee, left 09/18/2013     Priority: Medium     HTN (hypertension) 09/18/2013     Priority: Medium     Bell's palsy 04/05/2010     Priority: Medium     Overview:   And vestibulitis either post viral or from h1n1 shot       ACS (acute coronary syndrome) (H) 06/08/2009     Priority: Medium     CAD (coronary atherosclerotic disease) 06/08/2009     Priority: Medium     Overview:   Lad stent, low level pos troponin  plavix til 2011,        Hyperlipidemia 04/18/2007     Priority: Medium     Obesity, unspecified 04/18/2007     Priority: Medium     Tuberculosis of peripheral lymph nodes, 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    Past Surgical History:    Past Surgical History:  "  Procedure Laterality Date     COLONOSCOPY N/A 12/4/2017    Procedure: COLONOSCOPY;  Colonoscopy;  Surgeon: Amadou Diallo MD;  Location: [facility]       Family History:    No family history on file.    Social History:  Marital Status:   [2]  Social History     Tobacco Use     Smoking status: Former Smoker     Smokeless tobacco: Never Used   Substance Use Topics     Alcohol use: Yes     Comment: Occasional     Drug use: No        Medications:    cyclobenzaprine (FLEXERIL) 10 MG tablet  aspirin EC 81 MG EC tablet  atorvastatin (LIPITOR) 40 MG tablet  atorvastatin (LIPITOR) 40 MG tablet  FLUCONAZOLE PO  ibuprofen (ADVIL/MOTRIN) 800 MG tablet  ibuprofen (ADVIL/MOTRIN) 800 MG tablet  ketoconazole (NIZORAL) 2 % cream  lisinopril (PRINIVIL/ZESTRIL) 10 MG tablet  lisinopril (PRINIVIL/ZESTRIL) 10 MG tablet  naproxen (NAPROSYN) 500 MG tablet  nitroGLYcerin (NITROSTAT) 0.4 MG sublingual tablet  nitroGLYcerin (NITROSTAT) 0.4 MG sublingual tablet          Review of Systems   Constitutional: Positive for chills and fever.   HENT: Negative for congestion.    Respiratory: Negative for cough.    Cardiovascular: Negative for chest pain.   Gastrointestinal: Negative for abdominal pain, blood in stool, constipation, diarrhea, nausea and vomiting.   Genitourinary: Positive for flank pain. Negative for dysuria, frequency, hematuria and urgency.   Neurological: Negative.        Physical Exam   BP: (!) 160/66  Heart Rate: 52  Temp: 97.7  F (36.5  C)  Resp: 18  Height: 175.3 cm (5' 9\")  Weight: 123.4 kg (272 lb)  SpO2: 96 %      Physical Exam  Constitutional:       General: He is not in acute distress.     Appearance: Normal appearance.   HENT:      Head: Normocephalic and atraumatic.   Cardiovascular:      Rate and Rhythm: Normal rate and regular rhythm.   Pulmonary:      Effort: Pulmonary effort is normal.      Breath sounds: Normal breath sounds.   Musculoskeletal:        Back:    Skin:     General: Skin is warm and dry. "   Neurological:      General: No focal deficit present.      Mental Status: He is alert and oriented to person, place, and time.             ED Course        Procedures               Results for orders placed or performed during the hospital encounter of 01/02/20 (from the past 24 hour(s))   UA with Microscopic   Result Value Ref Range    Color Urine Yellow     Appearance Urine Clear     Glucose Urine Negative NEG^Negative mg/dL    Bilirubin Urine Negative NEG^Negative    Ketones Urine Negative NEG^Negative mg/dL    Specific Gravity Urine 1.016 1.003 - 1.035    Blood Urine Negative NEG^Negative    pH Urine 6.0 5.0 - 7.0 pH    Protein Albumin Urine Negative NEG^Negative mg/dL    Urobilinogen mg/dL 0.0 0.0 - 2.0 mg/dL    Nitrite Urine Negative NEG^Negative    Leukocyte Esterase Urine Negative NEG^Negative    Source Midstream Urine     WBC Urine <1 0 - 5 /HPF    RBC Urine 1 0 - 2 /HPF    Mucous Urine Present (A) NEG^Negative /LPF       Medications - No data to display    Assessments & Plan (with Medical Decision Making)   Urinalysis is normal.  I have low suspicion for bladder or kidney infection.  I have low suspicion for kidney stone.  I suspect this is a musculoskeletal injury/back strain since he has increased pain when he bends over towards that side.  Patient be treated with muscle relaxers.  Instructed to follow-up in clinic for persistent symptoms.  I have reviewed the nursing notes.    I have reviewed the findings, diagnosis, plan and need for follow up with the patient.      Discharge Medication List as of 1/2/2020  6:25 PM      START taking these medications    Details   cyclobenzaprine (FLEXERIL) 10 MG tablet Take 1 tablet (10 mg) by mouth 3 times daily as needed for muscle spasms, Disp-20 tablet, R-0, E-Prescribe             Final diagnoses:   Acute right-sided low back pain without sciatica       1/2/2020   [facility] EMERGENCY DEPARTMENT     Venus, NATHANIEL Vazquez CNP  01/02/20 [id number]

## 2024-04-03 ENCOUNTER — HOSPITAL ENCOUNTER (OUTPATIENT)
Facility: CLINIC | Age: 74
End: 2024-04-03
Attending: ORTHOPAEDIC SURGERY | Admitting: ORTHOPAEDIC SURGERY
Payer: MEDICARE

## (undated) RX ORDER — LIDOCAINE HYDROCHLORIDE 10 MG/ML
INJECTION, SOLUTION EPIDURAL; INFILTRATION; INTRACAUDAL; PERINEURAL
Status: DISPENSED
Start: 2017-12-04